# Patient Record
Sex: FEMALE | Race: WHITE | NOT HISPANIC OR LATINO | URBAN - METROPOLITAN AREA
[De-identification: names, ages, dates, MRNs, and addresses within clinical notes are randomized per-mention and may not be internally consistent; named-entity substitution may affect disease eponyms.]

---

## 2017-10-11 VITALS
SYSTOLIC BLOOD PRESSURE: 124 MMHG | TEMPERATURE: 98 F | WEIGHT: 179.9 LBS | HEART RATE: 75 BPM | RESPIRATION RATE: 18 BRPM | OXYGEN SATURATION: 98 % | HEIGHT: 69 IN | DIASTOLIC BLOOD PRESSURE: 65 MMHG

## 2017-10-11 RX ORDER — EZETIMIBE AND SIMVASTATIN 10; 80 MG/1; MG/1
1 TABLET, FILM COATED ORAL
Qty: 0 | Refills: 0 | COMMUNITY

## 2017-10-11 NOTE — PATIENT PROFILE ADULT. - PSH
Gastritis    History of laparoscopic cholecystectomy    History of total hysterectomy    Lactose Intolerance    S/P ORIF (open reduction internal fixation) fracture  left ankle   2 years ago

## 2017-10-11 NOTE — PATIENT PROFILE ADULT. - PMH
Anxiety    Colonic stricture    Diverticulitis    GI bleed    H/O hypercholesterolemia    H/O uterine leiomyoma    IBS (Irritable Bowel Syndrome)    Lumbar Disc Disease Anxiety    Colonic stricture    Diverticulitis    Fibromyalgia    GI bleed    H/O hypercholesterolemia    H/O uterine leiomyoma    IBS (Irritable Bowel Syndrome)    Lumbar Disc Disease

## 2017-10-11 NOTE — PATIENT PROFILE ADULT. - FAMILY HISTORY
Mother  Still living? Yes, Estimated age: 71-80  Family history of stroke or transient ischemic attack in mother, Age at diagnosis: Age Unknown     Father  Still living? No  Family history of cancer in mother, Age at diagnosis: Age Unknown

## 2017-10-12 ENCOUNTER — INPATIENT (INPATIENT)
Facility: HOSPITAL | Age: 52
LOS: 5 days | Discharge: HOME | DRG: 330 | End: 2017-10-18
Attending: SURGERY | Admitting: SURGERY
Payer: COMMERCIAL

## 2017-10-12 DIAGNOSIS — Z90.710 ACQUIRED ABSENCE OF BOTH CERVIX AND UTERUS: Chronic | ICD-10-CM

## 2017-10-12 DIAGNOSIS — K56.699 OTHER INTESTINAL OBSTRUCTION UNSPECIFIED AS TO PARTIAL VERSUS COMPLETE OBSTRUCTION: ICD-10-CM

## 2017-10-12 LAB
ANION GAP SERPL CALC-SCNC: 15 MMOL/L — SIGNIFICANT CHANGE UP (ref 5–17)
BASE EXCESS BLDA CALC-SCNC: -1.1 MMOL/L — SIGNIFICANT CHANGE UP (ref -2–3)
BASE EXCESS BLDA CALC-SCNC: -1.9 MMOL/L — SIGNIFICANT CHANGE UP (ref -2–3)
BUN SERPL-MCNC: 13 MG/DL — SIGNIFICANT CHANGE UP (ref 7–23)
CA-I BLDA-SCNC: 1.12 MMOL/L — SIGNIFICANT CHANGE UP (ref 1.12–1.3)
CA-I BLDA-SCNC: 1.15 MMOL/L — SIGNIFICANT CHANGE UP (ref 1.12–1.3)
CALCIUM SERPL-MCNC: 9.1 MG/DL — SIGNIFICANT CHANGE UP (ref 8.4–10.5)
CHLORIDE SERPL-SCNC: 100 MMOL/L — SIGNIFICANT CHANGE UP (ref 96–108)
CO2 SERPL-SCNC: 23 MMOL/L — SIGNIFICANT CHANGE UP (ref 22–31)
COHGB MFR BLDA: 1.7 % — HIGH
COHGB MFR BLDA: 2.2 % — HIGH
CREAT SERPL-MCNC: 0.65 MG/DL — SIGNIFICANT CHANGE UP (ref 0.5–1.3)
GAS PNL BLDA: SIGNIFICANT CHANGE UP
GAS PNL BLDA: SIGNIFICANT CHANGE UP
GLUCOSE BLDC GLUCOMTR-MCNC: 178 MG/DL — HIGH (ref 70–99)
GLUCOSE BLDC GLUCOMTR-MCNC: 88 MG/DL — SIGNIFICANT CHANGE UP (ref 70–99)
GLUCOSE SERPL-MCNC: 149 MG/DL — HIGH (ref 70–99)
HCO3 BLDA-SCNC: 22 MMOL/L — SIGNIFICANT CHANGE UP (ref 21–28)
HCO3 BLDA-SCNC: 23 MMOL/L — SIGNIFICANT CHANGE UP (ref 21–28)
HCT VFR BLD CALC: 40 % — SIGNIFICANT CHANGE UP (ref 34.5–45)
HGB BLD-MCNC: 13.3 G/DL — SIGNIFICANT CHANGE UP (ref 11.5–15.5)
HGB BLDA-MCNC: 14.9 G/DL — SIGNIFICANT CHANGE UP (ref 11.5–15.5)
HGB BLDA-MCNC: 15.6 G/DL — HIGH (ref 11.5–15.5)
MCHC RBC-ENTMCNC: 30.2 PG — SIGNIFICANT CHANGE UP (ref 27–34)
MCHC RBC-ENTMCNC: 33.3 G/DL — SIGNIFICANT CHANGE UP (ref 32–36)
MCV RBC AUTO: 90.9 FL — SIGNIFICANT CHANGE UP (ref 80–100)
METHGB MFR BLDA: 0.3 % — SIGNIFICANT CHANGE UP
METHGB MFR BLDA: 0.4 % — SIGNIFICANT CHANGE UP
O2 CT VFR BLDA CALC: 21 ML/DL — SIGNIFICANT CHANGE UP (ref 15–23)
O2 CT VFR BLDA CALC: 22 ML/DL — SIGNIFICANT CHANGE UP (ref 15–23)
OXYHGB MFR BLDA: 97 % — SIGNIFICANT CHANGE UP (ref 94–100)
OXYHGB MFR BLDA: 98 % — SIGNIFICANT CHANGE UP (ref 94–100)
PCO2 BLDA: 32 MMHG — SIGNIFICANT CHANGE UP (ref 32–45)
PCO2 BLDA: 39 MMHG — SIGNIFICANT CHANGE UP (ref 32–45)
PH BLDA: 7.39 — SIGNIFICANT CHANGE UP (ref 7.35–7.45)
PH BLDA: 7.46 — HIGH (ref 7.35–7.45)
PLATELET # BLD AUTO: 287 K/UL — SIGNIFICANT CHANGE UP (ref 150–400)
PO2 BLDA: 257 MMHG — HIGH (ref 83–108)
PO2 BLDA: 287 MMHG — HIGH (ref 83–108)
POTASSIUM BLDA-SCNC: 3.6 MMOL/L — SIGNIFICANT CHANGE UP (ref 3.5–4.9)
POTASSIUM BLDA-SCNC: 3.9 MMOL/L — SIGNIFICANT CHANGE UP (ref 3.5–4.9)
POTASSIUM SERPL-MCNC: 4 MMOL/L — SIGNIFICANT CHANGE UP (ref 3.5–5.3)
POTASSIUM SERPL-SCNC: 4 MMOL/L — SIGNIFICANT CHANGE UP (ref 3.5–5.3)
RBC # BLD: 4.4 M/UL — SIGNIFICANT CHANGE UP (ref 3.8–5.2)
RBC # FLD: 13.9 % — SIGNIFICANT CHANGE UP (ref 10.3–16.9)
SAO2 % BLDA: 100 % — SIGNIFICANT CHANGE UP (ref 95–100)
SAO2 % BLDA: 100 % — SIGNIFICANT CHANGE UP (ref 95–100)
SODIUM BLDA-SCNC: 134 MMOL/L — LOW (ref 138–146)
SODIUM BLDA-SCNC: 135 MMOL/L — LOW (ref 138–146)
SODIUM SERPL-SCNC: 138 MMOL/L — SIGNIFICANT CHANGE UP (ref 135–145)
WBC # BLD: 13.9 K/UL — HIGH (ref 3.8–10.5)
WBC # FLD AUTO: 13.9 K/UL — HIGH (ref 3.8–10.5)

## 2017-10-12 RX ORDER — HYDROMORPHONE HYDROCHLORIDE 2 MG/ML
0.5 INJECTION INTRAMUSCULAR; INTRAVENOUS; SUBCUTANEOUS EVERY 4 HOURS
Qty: 0 | Refills: 0 | Status: DISCONTINUED | OUTPATIENT
Start: 2017-10-12 | End: 2017-10-14

## 2017-10-12 RX ORDER — HYDROMORPHONE HYDROCHLORIDE 2 MG/ML
0.5 INJECTION INTRAMUSCULAR; INTRAVENOUS; SUBCUTANEOUS ONCE
Qty: 0 | Refills: 0 | Status: DISCONTINUED | OUTPATIENT
Start: 2017-10-12 | End: 2017-10-12

## 2017-10-12 RX ORDER — DULOXETINE HYDROCHLORIDE 30 MG/1
0 CAPSULE, DELAYED RELEASE ORAL
Qty: 0 | Refills: 0 | COMMUNITY

## 2017-10-12 RX ORDER — SODIUM CHLORIDE 9 MG/ML
1000 INJECTION, SOLUTION INTRAVENOUS
Qty: 0 | Refills: 0 | Status: DISCONTINUED | OUTPATIENT
Start: 2017-10-12 | End: 2017-10-16

## 2017-10-12 RX ORDER — HYDROMORPHONE HYDROCHLORIDE 2 MG/ML
0.5 INJECTION INTRAMUSCULAR; INTRAVENOUS; SUBCUTANEOUS
Qty: 0 | Refills: 0 | Status: DISCONTINUED | OUTPATIENT
Start: 2017-10-12 | End: 2017-10-14

## 2017-10-12 RX ORDER — HYDROMORPHONE HYDROCHLORIDE 2 MG/ML
1 INJECTION INTRAMUSCULAR; INTRAVENOUS; SUBCUTANEOUS EVERY 4 HOURS
Qty: 0 | Refills: 0 | Status: DISCONTINUED | OUTPATIENT
Start: 2017-10-12 | End: 2017-10-14

## 2017-10-12 RX ORDER — HYDROMORPHONE HYDROCHLORIDE 2 MG/ML
1 INJECTION INTRAMUSCULAR; INTRAVENOUS; SUBCUTANEOUS ONCE
Qty: 0 | Refills: 0 | Status: DISCONTINUED | OUTPATIENT
Start: 2017-10-12 | End: 2017-10-12

## 2017-10-12 RX ORDER — HEPARIN SODIUM 5000 [USP'U]/ML
5000 INJECTION INTRAVENOUS; SUBCUTANEOUS EVERY 8 HOURS
Qty: 0 | Refills: 0 | Status: DISCONTINUED | OUTPATIENT
Start: 2017-10-13 | End: 2017-10-18

## 2017-10-12 RX ORDER — CYCLOBENZAPRINE HYDROCHLORIDE 10 MG/1
0 TABLET, FILM COATED ORAL
Qty: 0 | Refills: 0 | COMMUNITY

## 2017-10-12 RX ORDER — EZETIMIBE AND SIMVASTATIN 10; 80 MG/1; MG/1
1 TABLET, FILM COATED ORAL
Qty: 0 | Refills: 0 | COMMUNITY

## 2017-10-12 RX ORDER — ACETAMINOPHEN 500 MG
1000 TABLET ORAL ONCE
Qty: 0 | Refills: 0 | Status: COMPLETED | OUTPATIENT
Start: 2017-10-12 | End: 2017-10-13

## 2017-10-12 RX ORDER — ONDANSETRON 8 MG/1
4 TABLET, FILM COATED ORAL EVERY 6 HOURS
Qty: 0 | Refills: 0 | Status: DISCONTINUED | OUTPATIENT
Start: 2017-10-12 | End: 2017-10-18

## 2017-10-12 RX ADMIN — HYDROMORPHONE HYDROCHLORIDE 1 MILLIGRAM(S): 2 INJECTION INTRAMUSCULAR; INTRAVENOUS; SUBCUTANEOUS at 17:32

## 2017-10-12 RX ADMIN — HYDROMORPHONE HYDROCHLORIDE 1 MILLIGRAM(S): 2 INJECTION INTRAMUSCULAR; INTRAVENOUS; SUBCUTANEOUS at 18:35

## 2017-10-12 RX ADMIN — HYDROMORPHONE HYDROCHLORIDE 0.5 MILLIGRAM(S): 2 INJECTION INTRAMUSCULAR; INTRAVENOUS; SUBCUTANEOUS at 20:50

## 2017-10-12 RX ADMIN — HYDROMORPHONE HYDROCHLORIDE 1 MILLIGRAM(S): 2 INJECTION INTRAMUSCULAR; INTRAVENOUS; SUBCUTANEOUS at 23:26

## 2017-10-12 RX ADMIN — HYDROMORPHONE HYDROCHLORIDE 1 MILLIGRAM(S): 2 INJECTION INTRAMUSCULAR; INTRAVENOUS; SUBCUTANEOUS at 18:20

## 2017-10-12 RX ADMIN — HYDROMORPHONE HYDROCHLORIDE 1 MILLIGRAM(S): 2 INJECTION INTRAMUSCULAR; INTRAVENOUS; SUBCUTANEOUS at 23:00

## 2017-10-12 RX ADMIN — HYDROMORPHONE HYDROCHLORIDE 1 MILLIGRAM(S): 2 INJECTION INTRAMUSCULAR; INTRAVENOUS; SUBCUTANEOUS at 17:10

## 2017-10-12 RX ADMIN — HYDROMORPHONE HYDROCHLORIDE 0.5 MILLIGRAM(S): 2 INJECTION INTRAMUSCULAR; INTRAVENOUS; SUBCUTANEOUS at 23:26

## 2017-10-12 NOTE — PROGRESS NOTE ADULT - SUBJECTIVE AND OBJECTIVE BOX
Team 4 Surgery Post-Op Note, PCN:     Pre-Op Dx: Diverticular disease  Procedure: Sigmoidectomy, laparoscopic    Surgeon: Will     Subjective: Resting comfortably.     Vital Signs Last 24 Hrs  T(C): 36.7 (12 Oct 2017 17:00), Max: 37.5 (12 Oct 2017 15:28)  T(F): 98.1 (12 Oct 2017 17:00), Max: 99.5 (12 Oct 2017 15:28)  HR: 82 (12 Oct 2017 17:30) (82 - 94)  BP: 100/55 (12 Oct 2017 17:00) (100/55 - 111/52)  BP(mean): 69 (12 Oct 2017 17:00) (66 - 72)  RR: 18 (12 Oct 2017 17:30) (10 - 20)  SpO2: 97% (12 Oct 2017 17:30) (97% - 100%)    Physical Exam:  General: NAD, resting comfortably in bed  Pulmonary: Nonlabored breathing, no respiratory distress  Cardiovascular: NSR  Abdominal: soft, NT/ND, Incisions C/D/I.   : Sapp with Ureteral stents.   Extremities: WWP, normal strength  Neuro: A/O x 3, CNs II-XII grossly intact, no focal deficits, normal motor/sensation  Pulses: palpable distal pulses    CAPILLARY BLOOD GLUCOSE      POCT Blood Glucose.: 178 mg/dL (12 Oct 2017 12:58)  POCT Blood Glucose.: 88 mg/dL (12 Oct 2017 08:15)        ABO Interpretation: O (10-12 @ 08:40)

## 2017-10-12 NOTE — PROGRESS NOTE ADULT - ASSESSMENT
52y Female s/p Lap-sigmoidectomy     Pain/nausea control PRN  Home meds  Incentive spirometer/OOB/Ambulate  IVF, Diet: NPO   AM labs  Sapp w/ uretheral stents

## 2017-10-12 NOTE — H&P ADULT - PMH
Anxiety    Colonic stricture    Diverticulitis    Fibromyalgia    GI bleed    H/O hypercholesterolemia    H/O uterine leiomyoma    IBS (Irritable Bowel Syndrome)    Lumbar Disc Disease

## 2017-10-12 NOTE — H&P ADULT - HISTORY OF PRESENT ILLNESS
51YOF with diverticular disease and subsequent stricture presents for elective colon resection (sigmoidectomy, EEA). Of note, patient found to have suspicious hepatic mass, suggestive of hemangioma.

## 2017-10-12 NOTE — BRIEF OPERATIVE NOTE - PROCEDURE
<<-----Click on this checkbox to enter Procedure Sigmoidectomy, laparoscopic  10/12/2017    Active  [facility]

## 2017-10-12 NOTE — H&P ADULT - PROBLEM SELECTOR PLAN 1
- To OR for elective colon resection and primary anastomosis.   - Admit to surgery post-operatively.   - Pain control. Nausea control.   - IVF.   - Await bowel function.   - Sapp, ureteral stents.   - DVT prophylaxis.

## 2017-10-12 NOTE — H&P ADULT - ASSESSMENT
51YOF with diverticular disease and inflammatory stricture presents for elective sigmoid resection and EEA.

## 2017-10-13 LAB
ANION GAP SERPL CALC-SCNC: 14 MMOL/L — SIGNIFICANT CHANGE UP (ref 5–17)
BASOPHILS NFR BLD AUTO: 0.1 % — SIGNIFICANT CHANGE UP (ref 0–2)
BUN SERPL-MCNC: 12 MG/DL — SIGNIFICANT CHANGE UP (ref 7–23)
CALCIUM SERPL-MCNC: 9.2 MG/DL — SIGNIFICANT CHANGE UP (ref 8.4–10.5)
CHLORIDE SERPL-SCNC: 99 MMOL/L — SIGNIFICANT CHANGE UP (ref 96–108)
CO2 SERPL-SCNC: 23 MMOL/L — SIGNIFICANT CHANGE UP (ref 22–31)
CREAT SERPL-MCNC: 0.59 MG/DL — SIGNIFICANT CHANGE UP (ref 0.5–1.3)
GLUCOSE SERPL-MCNC: 131 MG/DL — HIGH (ref 70–99)
HCT VFR BLD CALC: 37.1 % — SIGNIFICANT CHANGE UP (ref 34.5–45)
HGB BLD-MCNC: 12.4 G/DL — SIGNIFICANT CHANGE UP (ref 11.5–15.5)
LYMPHOCYTES # BLD AUTO: 6.4 % — LOW (ref 13–44)
MCHC RBC-ENTMCNC: 30.5 PG — SIGNIFICANT CHANGE UP (ref 27–34)
MCHC RBC-ENTMCNC: 33.4 G/DL — SIGNIFICANT CHANGE UP (ref 32–36)
MCV RBC AUTO: 91.4 FL — SIGNIFICANT CHANGE UP (ref 80–100)
MONOCYTES NFR BLD AUTO: 7.2 % — SIGNIFICANT CHANGE UP (ref 2–14)
NEUTROPHILS NFR BLD AUTO: 86.3 % — HIGH (ref 43–77)
PLATELET # BLD AUTO: 264 K/UL — SIGNIFICANT CHANGE UP (ref 150–400)
POTASSIUM SERPL-MCNC: 4.2 MMOL/L — SIGNIFICANT CHANGE UP (ref 3.5–5.3)
POTASSIUM SERPL-SCNC: 4.2 MMOL/L — SIGNIFICANT CHANGE UP (ref 3.5–5.3)
RBC # BLD: 4.06 M/UL — SIGNIFICANT CHANGE UP (ref 3.8–5.2)
RBC # FLD: 13.7 % — SIGNIFICANT CHANGE UP (ref 10.3–16.9)
SODIUM SERPL-SCNC: 136 MMOL/L — SIGNIFICANT CHANGE UP (ref 135–145)
WBC # BLD: 14.1 K/UL — HIGH (ref 3.8–10.5)
WBC # FLD AUTO: 14.1 K/UL — HIGH (ref 3.8–10.5)

## 2017-10-13 RX ORDER — DIPHENHYDRAMINE HCL 50 MG
25 CAPSULE ORAL ONCE
Qty: 0 | Refills: 0 | Status: COMPLETED | OUTPATIENT
Start: 2017-10-13 | End: 2017-10-13

## 2017-10-13 RX ADMIN — HEPARIN SODIUM 5000 UNIT(S): 5000 INJECTION INTRAVENOUS; SUBCUTANEOUS at 06:37

## 2017-10-13 RX ADMIN — HYDROMORPHONE HYDROCHLORIDE 0.5 MILLIGRAM(S): 2 INJECTION INTRAMUSCULAR; INTRAVENOUS; SUBCUTANEOUS at 13:57

## 2017-10-13 RX ADMIN — HYDROMORPHONE HYDROCHLORIDE 1 MILLIGRAM(S): 2 INJECTION INTRAMUSCULAR; INTRAVENOUS; SUBCUTANEOUS at 16:30

## 2017-10-13 RX ADMIN — Medication 1000 MILLIGRAM(S): at 00:21

## 2017-10-13 RX ADMIN — HYDROMORPHONE HYDROCHLORIDE 0.5 MILLIGRAM(S): 2 INJECTION INTRAMUSCULAR; INTRAVENOUS; SUBCUTANEOUS at 04:35

## 2017-10-13 RX ADMIN — HYDROMORPHONE HYDROCHLORIDE 1 MILLIGRAM(S): 2 INJECTION INTRAMUSCULAR; INTRAVENOUS; SUBCUTANEOUS at 16:18

## 2017-10-13 RX ADMIN — HEPARIN SODIUM 5000 UNIT(S): 5000 INJECTION INTRAVENOUS; SUBCUTANEOUS at 21:02

## 2017-10-13 RX ADMIN — Medication 25 MILLIGRAM(S): at 23:47

## 2017-10-13 RX ADMIN — HEPARIN SODIUM 5000 UNIT(S): 5000 INJECTION INTRAVENOUS; SUBCUTANEOUS at 13:58

## 2017-10-13 RX ADMIN — Medication 400 MILLIGRAM(S): at 00:04

## 2017-10-13 RX ADMIN — HYDROMORPHONE HYDROCHLORIDE 0.5 MILLIGRAM(S): 2 INJECTION INTRAMUSCULAR; INTRAVENOUS; SUBCUTANEOUS at 09:02

## 2017-10-13 RX ADMIN — HYDROMORPHONE HYDROCHLORIDE 1 MILLIGRAM(S): 2 INJECTION INTRAMUSCULAR; INTRAVENOUS; SUBCUTANEOUS at 12:05

## 2017-10-13 RX ADMIN — SODIUM CHLORIDE 100 MILLILITER(S): 9 INJECTION, SOLUTION INTRAVENOUS at 18:01

## 2017-10-13 RX ADMIN — HYDROMORPHONE HYDROCHLORIDE 0.5 MILLIGRAM(S): 2 INJECTION INTRAMUSCULAR; INTRAVENOUS; SUBCUTANEOUS at 14:22

## 2017-10-13 RX ADMIN — HYDROMORPHONE HYDROCHLORIDE 0.5 MILLIGRAM(S): 2 INJECTION INTRAMUSCULAR; INTRAVENOUS; SUBCUTANEOUS at 09:27

## 2017-10-13 RX ADMIN — HYDROMORPHONE HYDROCHLORIDE 1 MILLIGRAM(S): 2 INJECTION INTRAMUSCULAR; INTRAVENOUS; SUBCUTANEOUS at 21:05

## 2017-10-13 RX ADMIN — Medication 25 MILLIGRAM(S): at 12:30

## 2017-10-13 RX ADMIN — HYDROMORPHONE HYDROCHLORIDE 1 MILLIGRAM(S): 2 INJECTION INTRAMUSCULAR; INTRAVENOUS; SUBCUTANEOUS at 20:55

## 2017-10-13 RX ADMIN — HYDROMORPHONE HYDROCHLORIDE 1 MILLIGRAM(S): 2 INJECTION INTRAMUSCULAR; INTRAVENOUS; SUBCUTANEOUS at 11:46

## 2017-10-13 NOTE — PROGRESS NOTE ADULT - ASSESSMENT
51YOF with diverticular disease and subsequent stricture s/p elective colon resection (sigmoidectomy, EEA).    - Admitted to surgery post-operatively.   - Pain under control. Nausea under control.   - IVF- LR   - Awaiting bowel function  - Sapp, ureteral stents in place  - DVT prophylaxis- Hep SQ

## 2017-10-13 NOTE — PROGRESS NOTE ADULT - SUBJECTIVE AND OBJECTIVE BOX
ON: PREET  10/12: Lap sigmoidectomy, end-anastomosis. POC WNL.         51YOF with diverticular disease and subsequent stricture s/p elective colon resection (sigmoidectomy, EEA).  - Admit to surgery post-operatively.   - Pain control. Nausea control.   - IVF.   - Await bowel function.   - Sapp, ureteral stents.   - DVT prophylaxis. STATUS POST:   Lap sigmoidectomy, end-anastomosis    POST OPERATIVE DAY #: 1    Vital Signs Last 24 Hrs  T(C): 37 (13 Oct 2017 07:30), Max: 37.5 (12 Oct 2017 15:28)  T(F): 98.6 (13 Oct 2017 07:30), Max: 99.5 (12 Oct 2017 15:28)  HR: 73 (13 Oct 2017 07:10) (70 - 94)  BP: 109/55 (13 Oct 2017 07:10) (93/53 - 119/71)  BP(mean): 72 (12 Oct 2017 22:00) (64 - 88)  RR: 16 (13 Oct 2017 07:10) (10 - 20)  SpO2: 100% (13 Oct 2017 07:10) (92% - 100%)      SUBJECTIVE: Pt seen and examined. Reports pain is under control and she is resting comfortably following the procedure yesterday. She has not had flatus or BM yet.                Pain Control Adequate: [x ] YES [ ] NO  SOB: [ ]YES [x ] NO  Chest Discomfort: [ ] YES [ x] NO    Nausea: [ ] YES [x ] NO           Vomiting: [ ] YES [x ] NO  Flatus: [ ] YES [x ] NO             Bowel Movement: [ ] YES [x ] NO     Void: [x ]YES [ ]No    Sapp: yes      General Appearance: Appears well, NAD  Neck: Supple  Chest: Equal expansion bilaterally, equal breath sounds  CV: Pulse regular presently  Abdomen: Soft, nontense, appropriate incisional tenderness, dressings clean and dry and intact  Extremities: Grossly symmetric, SCD's in place     I&O's Summary    12 Oct 2017 07:01  -  13 Oct 2017 07:00  --------------------------------------------------------  IN: 4200 mL / OUT: 2255 mL / NET: 1945 mL    13 Oct 2017 07:01  -  13 Oct 2017 07:40  --------------------------------------------------------  IN: 100 mL / OUT: 80 mL / NET: 20 mL      I&O's Detail    12 Oct 2017 07:01  -  13 Oct 2017 07:00  --------------------------------------------------------  IN:    lactated ringers.: 4200 mL  Total IN: 4200 mL    OUT:    Estimated Blood Loss: 100 mL    Indwelling Catheter - Urethral: 1670 mL    Ureteral Catheter: 485 mL  Total OUT: 2255 mL    Total NET: 1945 mL      13 Oct 2017 07:01  -  13 Oct 2017 07:40  --------------------------------------------------------  IN:    lactated ringers.: 100 mL  Total IN: 100 mL    OUT:    Indwelling Catheter - Urethral: 60 mL    Ureteral Catheter: 20 mL  Total OUT: 80 mL    Total NET: 20 mL          MEDICATIONS  (STANDING):  diphenhydrAMINE   Injectable 25 milliGRAM(s) IV Push once  heparin  Injectable 5000 Unit(s) SubCutaneous every 8 hours  lactated ringers. 1000 milliLiter(s) (100 mL/Hr) IV Continuous <Continuous>    MEDICATIONS  (PRN):  HYDROmorphone  Injectable 1 milliGRAM(s) IV Push every 4 hours PRN Severe Pain  HYDROmorphone  Injectable 0.5 milliGRAM(s) IV Push every 2 hours PRN breakthough pain  HYDROmorphone  Injectable 0.5 milliGRAM(s) IV Push every 4 hours PRN Moderate Pain (4 - 6)  ondansetron Injectable 4 milliGRAM(s) IV Push every 6 hours PRN Nausea      LABS:                        12.4   14.1  )-----------( 264      ( 13 Oct 2017 03:55 )             37.1     10-13    136  |  99  |  12  ----------------------------<  131<H>  4.2   |  23  |  0.59    Ca    9.2      13 Oct 2017 03:55            RADIOLOGY & ADDITIONAL STUDIES:

## 2017-10-14 LAB
ANION GAP SERPL CALC-SCNC: 13 MMOL/L — SIGNIFICANT CHANGE UP (ref 5–17)
BUN SERPL-MCNC: 13 MG/DL — SIGNIFICANT CHANGE UP (ref 7–23)
CALCIUM SERPL-MCNC: 9.1 MG/DL — SIGNIFICANT CHANGE UP (ref 8.4–10.5)
CHLORIDE SERPL-SCNC: 98 MMOL/L — SIGNIFICANT CHANGE UP (ref 96–108)
CO2 SERPL-SCNC: 27 MMOL/L — SIGNIFICANT CHANGE UP (ref 22–31)
CREAT SERPL-MCNC: 0.61 MG/DL — SIGNIFICANT CHANGE UP (ref 0.5–1.3)
GLUCOSE SERPL-MCNC: 66 MG/DL — LOW (ref 70–99)
HCT VFR BLD CALC: 36.2 % — SIGNIFICANT CHANGE UP (ref 34.5–45)
HGB BLD-MCNC: 11.9 G/DL — SIGNIFICANT CHANGE UP (ref 11.5–15.5)
MAGNESIUM SERPL-MCNC: 2.2 MG/DL — SIGNIFICANT CHANGE UP (ref 1.6–2.6)
MCHC RBC-ENTMCNC: 30.9 PG — SIGNIFICANT CHANGE UP (ref 27–34)
MCHC RBC-ENTMCNC: 32.9 G/DL — SIGNIFICANT CHANGE UP (ref 32–36)
MCV RBC AUTO: 94 FL — SIGNIFICANT CHANGE UP (ref 80–100)
PHOSPHATE SERPL-MCNC: 2.5 MG/DL — SIGNIFICANT CHANGE UP (ref 2.5–4.5)
PLATELET # BLD AUTO: 226 K/UL — SIGNIFICANT CHANGE UP (ref 150–400)
POTASSIUM SERPL-MCNC: 3.3 MMOL/L — LOW (ref 3.5–5.3)
POTASSIUM SERPL-SCNC: 3.3 MMOL/L — LOW (ref 3.5–5.3)
RBC # BLD: 3.85 M/UL — SIGNIFICANT CHANGE UP (ref 3.8–5.2)
RBC # FLD: 13.8 % — SIGNIFICANT CHANGE UP (ref 10.3–16.9)
SODIUM SERPL-SCNC: 138 MMOL/L — SIGNIFICANT CHANGE UP (ref 135–145)
WBC # BLD: 9.8 K/UL — SIGNIFICANT CHANGE UP (ref 3.8–10.5)
WBC # FLD AUTO: 9.8 K/UL — SIGNIFICANT CHANGE UP (ref 3.8–10.5)

## 2017-10-14 RX ORDER — DIAZEPAM 5 MG
1 TABLET ORAL AT BEDTIME
Qty: 0 | Refills: 0 | Status: DISCONTINUED | OUTPATIENT
Start: 2017-10-14 | End: 2017-10-18

## 2017-10-14 RX ORDER — OXYCODONE AND ACETAMINOPHEN 5; 325 MG/1; MG/1
1 TABLET ORAL EVERY 4 HOURS
Qty: 0 | Refills: 0 | Status: DISCONTINUED | OUTPATIENT
Start: 2017-10-14 | End: 2017-10-18

## 2017-10-14 RX ORDER — OXYCODONE AND ACETAMINOPHEN 5; 325 MG/1; MG/1
2 TABLET ORAL EVERY 4 HOURS
Qty: 0 | Refills: 0 | Status: DISCONTINUED | OUTPATIENT
Start: 2017-10-14 | End: 2017-10-18

## 2017-10-14 RX ORDER — DULOXETINE HYDROCHLORIDE 30 MG/1
30 CAPSULE, DELAYED RELEASE ORAL DAILY
Qty: 0 | Refills: 0 | Status: DISCONTINUED | OUTPATIENT
Start: 2017-10-14 | End: 2017-10-18

## 2017-10-14 RX ORDER — CYCLOBENZAPRINE HYDROCHLORIDE 10 MG/1
10 TABLET, FILM COATED ORAL AT BEDTIME
Qty: 0 | Refills: 0 | Status: DISCONTINUED | OUTPATIENT
Start: 2017-10-14 | End: 2017-10-18

## 2017-10-14 RX ORDER — MAGNESIUM SULFATE 500 MG/ML
2 VIAL (ML) INJECTION ONCE
Qty: 0 | Refills: 0 | Status: COMPLETED | OUTPATIENT
Start: 2017-10-14 | End: 2017-10-14

## 2017-10-14 RX ORDER — POTASSIUM CHLORIDE 20 MEQ
10 PACKET (EA) ORAL
Qty: 0 | Refills: 0 | Status: COMPLETED | OUTPATIENT
Start: 2017-10-14 | End: 2017-10-14

## 2017-10-14 RX ORDER — POTASSIUM CHLORIDE 20 MEQ
20 PACKET (EA) ORAL
Qty: 0 | Refills: 0 | Status: COMPLETED | OUTPATIENT
Start: 2017-10-14 | End: 2017-10-14

## 2017-10-14 RX ADMIN — SODIUM CHLORIDE 100 MILLILITER(S): 9 INJECTION, SOLUTION INTRAVENOUS at 13:44

## 2017-10-14 RX ADMIN — HYDROMORPHONE HYDROCHLORIDE 1 MILLIGRAM(S): 2 INJECTION INTRAMUSCULAR; INTRAVENOUS; SUBCUTANEOUS at 10:31

## 2017-10-14 RX ADMIN — Medication 20 MILLIEQUIVALENT(S): at 23:07

## 2017-10-14 RX ADMIN — OXYCODONE AND ACETAMINOPHEN 2 TABLET(S): 5; 325 TABLET ORAL at 16:47

## 2017-10-14 RX ADMIN — DULOXETINE HYDROCHLORIDE 30 MILLIGRAM(S): 30 CAPSULE, DELAYED RELEASE ORAL at 11:43

## 2017-10-14 RX ADMIN — HEPARIN SODIUM 5000 UNIT(S): 5000 INJECTION INTRAVENOUS; SUBCUTANEOUS at 06:22

## 2017-10-14 RX ADMIN — HEPARIN SODIUM 5000 UNIT(S): 5000 INJECTION INTRAVENOUS; SUBCUTANEOUS at 22:00

## 2017-10-14 RX ADMIN — Medication 100 MILLIEQUIVALENT(S): at 13:44

## 2017-10-14 RX ADMIN — CYCLOBENZAPRINE HYDROCHLORIDE 10 MILLIGRAM(S): 10 TABLET, FILM COATED ORAL at 23:09

## 2017-10-14 RX ADMIN — HYDROMORPHONE HYDROCHLORIDE 0.5 MILLIGRAM(S): 2 INJECTION INTRAMUSCULAR; INTRAVENOUS; SUBCUTANEOUS at 02:55

## 2017-10-14 RX ADMIN — HYDROMORPHONE HYDROCHLORIDE 1 MILLIGRAM(S): 2 INJECTION INTRAMUSCULAR; INTRAVENOUS; SUBCUTANEOUS at 15:26

## 2017-10-14 RX ADMIN — HYDROMORPHONE HYDROCHLORIDE 1 MILLIGRAM(S): 2 INJECTION INTRAMUSCULAR; INTRAVENOUS; SUBCUTANEOUS at 00:46

## 2017-10-14 RX ADMIN — HYDROMORPHONE HYDROCHLORIDE 1 MILLIGRAM(S): 2 INJECTION INTRAMUSCULAR; INTRAVENOUS; SUBCUTANEOUS at 14:39

## 2017-10-14 RX ADMIN — Medication 50 GRAM(S): at 10:31

## 2017-10-14 RX ADMIN — OXYCODONE AND ACETAMINOPHEN 2 TABLET(S): 5; 325 TABLET ORAL at 17:30

## 2017-10-14 RX ADMIN — HYDROMORPHONE HYDROCHLORIDE 1 MILLIGRAM(S): 2 INJECTION INTRAMUSCULAR; INTRAVENOUS; SUBCUTANEOUS at 06:35

## 2017-10-14 RX ADMIN — Medication 100 MILLIEQUIVALENT(S): at 11:46

## 2017-10-14 RX ADMIN — OXYCODONE AND ACETAMINOPHEN 2 TABLET(S): 5; 325 TABLET ORAL at 21:47

## 2017-10-14 RX ADMIN — Medication 100 MILLIEQUIVALENT(S): at 16:29

## 2017-10-14 RX ADMIN — Medication 1 MILLIGRAM(S): at 21:09

## 2017-10-14 RX ADMIN — HYDROMORPHONE HYDROCHLORIDE 1 MILLIGRAM(S): 2 INJECTION INTRAMUSCULAR; INTRAVENOUS; SUBCUTANEOUS at 01:00

## 2017-10-14 RX ADMIN — HYDROMORPHONE HYDROCHLORIDE 1 MILLIGRAM(S): 2 INJECTION INTRAMUSCULAR; INTRAVENOUS; SUBCUTANEOUS at 11:10

## 2017-10-14 RX ADMIN — HEPARIN SODIUM 5000 UNIT(S): 5000 INJECTION INTRAVENOUS; SUBCUTANEOUS at 13:44

## 2017-10-14 RX ADMIN — OXYCODONE AND ACETAMINOPHEN 2 TABLET(S): 5; 325 TABLET ORAL at 20:47

## 2017-10-14 RX ADMIN — HYDROMORPHONE HYDROCHLORIDE 0.5 MILLIGRAM(S): 2 INJECTION INTRAMUSCULAR; INTRAVENOUS; SUBCUTANEOUS at 03:05

## 2017-10-14 RX ADMIN — HYDROMORPHONE HYDROCHLORIDE 1 MILLIGRAM(S): 2 INJECTION INTRAMUSCULAR; INTRAVENOUS; SUBCUTANEOUS at 06:22

## 2017-10-14 RX ADMIN — Medication 20 MILLIEQUIVALENT(S): at 21:10

## 2017-10-14 NOTE — PROGRESS NOTE ADULT - ASSESSMENT
51YOF with diverticular disease and subsequent stricture s/p elective colon resection (sigmoidectomy, EEA).     Neuro: percocet PRN, ativan at bedtime, flexiril prn, cymbalta , zofran prn   CV: Normotensive goals  Pulm: RA  GI/FEN: CLD  : Voids  ID: none  Endo: None  Heme: None  PPx: SCDs, OOB/A, IS

## 2017-10-14 NOTE — PROGRESS NOTE ADULT - SUBJECTIVE AND OBJECTIVE BOX
ON: PREET, no flatus, no BM        51YOF with diverticular disease and subsequent stricture s/p elective colon resection (sigmoidectomy, EEA).     - Pain control. Nausea control.   - IVF.   - NPO sips and chips  - Await bowel function.   - Sapp,   - DVT prophylaxis. ON: PREET, no flatus, no BM    SUBJECTIVE: Patient seen and examined bedside by surgery team. No ROBF. Restarted on home meds. Tolerating clears today. Sapp out and passed TOV with good UOP. heplocked.     heparin  Injectable 5000 Unit(s) SubCutaneous every 8 hours      Vital Signs Last 24 Hrs  T(C): 37.4 (14 Oct 2017 15:42), Max: 37.4 (14 Oct 2017 15:42)  T(F): 99.4 (14 Oct 2017 15:42), Max: 99.4 (14 Oct 2017 15:42)  HR: 83 (14 Oct 2017 15:42) (75 - 83)  BP: 108/66 (14 Oct 2017 15:42) (99/63 - 126/75)  BP(mean): --  RR: 18 (14 Oct 2017 15:42) (15 - 18)  SpO2: 92% (14 Oct 2017 15:42) (92% - 98%)  I&O's Detail    13 Oct 2017 07:01  -  14 Oct 2017 07:00  --------------------------------------------------------  IN:    lactated ringers.: 2000 mL  Total IN: 2000 mL    OUT:    Indwelling Catheter - Urethral: 2445 mL    Ureteral Catheter: 30 mL  Total OUT: 2475 mL    Total NET: -475 mL      14 Oct 2017 07:01  -  14 Oct 2017 19:54  --------------------------------------------------------  IN:    lactated ringers.: 1100 mL    Solution: 300 mL    Solution: 50 mL  Total IN: 1450 mL    OUT:    Indwelling Catheter - Urethral: 1425 mL    Voided: 1600 mL  Total OUT: 3025 mL    Total NET: -1575 mL          General: NAD, resting comfortably in bed  C/V: NSR  Pulm: Nonlabored breathing, no respiratory distress  Abd: soft, NT/ND. incisions c/d/i. no r/g   Extrem: WWP, no edema, SCDs in place      LABS:                        11.9   9.8   )-----------( 226      ( 14 Oct 2017 08:32 )             36.2     10-14    138  |  98  |  13  ----------------------------<  66<L>  3.3<L>   |  27  |  0.61    Ca    9.1      14 Oct 2017 08:32  Phos  2.5     10-14  Mg     2.2     10-14            RADIOLOGY & ADDITIONAL STUDIES:

## 2017-10-15 LAB
ANION GAP SERPL CALC-SCNC: 12 MMOL/L — SIGNIFICANT CHANGE UP (ref 5–17)
BUN SERPL-MCNC: 7 MG/DL — SIGNIFICANT CHANGE UP (ref 7–23)
CALCIUM SERPL-MCNC: 9.6 MG/DL — SIGNIFICANT CHANGE UP (ref 8.4–10.5)
CHLORIDE SERPL-SCNC: 98 MMOL/L — SIGNIFICANT CHANGE UP (ref 96–108)
CO2 SERPL-SCNC: 28 MMOL/L — SIGNIFICANT CHANGE UP (ref 22–31)
CREAT SERPL-MCNC: 0.61 MG/DL — SIGNIFICANT CHANGE UP (ref 0.5–1.3)
GLUCOSE SERPL-MCNC: 105 MG/DL — HIGH (ref 70–99)
MAGNESIUM SERPL-MCNC: 2.3 MG/DL — SIGNIFICANT CHANGE UP (ref 1.6–2.6)
PHOSPHATE SERPL-MCNC: 3.6 MG/DL — SIGNIFICANT CHANGE UP (ref 2.5–4.5)
POTASSIUM SERPL-MCNC: 4 MMOL/L — SIGNIFICANT CHANGE UP (ref 3.5–5.3)
POTASSIUM SERPL-SCNC: 4 MMOL/L — SIGNIFICANT CHANGE UP (ref 3.5–5.3)
SODIUM SERPL-SCNC: 138 MMOL/L — SIGNIFICANT CHANGE UP (ref 135–145)

## 2017-10-15 RX ADMIN — OXYCODONE AND ACETAMINOPHEN 2 TABLET(S): 5; 325 TABLET ORAL at 05:21

## 2017-10-15 RX ADMIN — OXYCODONE AND ACETAMINOPHEN 2 TABLET(S): 5; 325 TABLET ORAL at 12:25

## 2017-10-15 RX ADMIN — ONDANSETRON 4 MILLIGRAM(S): 8 TABLET, FILM COATED ORAL at 09:09

## 2017-10-15 RX ADMIN — OXYCODONE AND ACETAMINOPHEN 2 TABLET(S): 5; 325 TABLET ORAL at 22:42

## 2017-10-15 RX ADMIN — HEPARIN SODIUM 5000 UNIT(S): 5000 INJECTION INTRAVENOUS; SUBCUTANEOUS at 14:24

## 2017-10-15 RX ADMIN — OXYCODONE AND ACETAMINOPHEN 2 TABLET(S): 5; 325 TABLET ORAL at 21:42

## 2017-10-15 RX ADMIN — OXYCODONE AND ACETAMINOPHEN 2 TABLET(S): 5; 325 TABLET ORAL at 11:36

## 2017-10-15 RX ADMIN — DULOXETINE HYDROCHLORIDE 30 MILLIGRAM(S): 30 CAPSULE, DELAYED RELEASE ORAL at 11:33

## 2017-10-15 RX ADMIN — HEPARIN SODIUM 5000 UNIT(S): 5000 INJECTION INTRAVENOUS; SUBCUTANEOUS at 06:00

## 2017-10-15 RX ADMIN — SODIUM CHLORIDE 100 MILLILITER(S): 9 INJECTION, SOLUTION INTRAVENOUS at 17:05

## 2017-10-15 RX ADMIN — OXYCODONE AND ACETAMINOPHEN 2 TABLET(S): 5; 325 TABLET ORAL at 06:21

## 2017-10-15 RX ADMIN — OXYCODONE AND ACETAMINOPHEN 2 TABLET(S): 5; 325 TABLET ORAL at 17:05

## 2017-10-15 RX ADMIN — OXYCODONE AND ACETAMINOPHEN 2 TABLET(S): 5; 325 TABLET ORAL at 18:00

## 2017-10-15 RX ADMIN — HEPARIN SODIUM 5000 UNIT(S): 5000 INJECTION INTRAVENOUS; SUBCUTANEOUS at 22:00

## 2017-10-15 RX ADMIN — Medication 1 MILLIGRAM(S): at 21:48

## 2017-10-15 NOTE — PROVIDER CONTACT NOTE (OTHER) - RECOMMENDATIONS
JOSE Osorio made aware and stated it is probably due to distention that surrounding lap site areas are reddened.

## 2017-10-15 NOTE — PROGRESS NOTE ADULT - SUBJECTIVE AND OBJECTIVE BOX
SUBJECTIVE: Patient seen and examined bedside by surgery team. No ROBF. Restarted on home meds. Tolerating clears. restarted IF fluids          Vital Signs Last 24 Hrs  T(C): 36.7 (15 Oct 2017 09:07), Max: 37.4 (14 Oct 2017 15:42)  T(F): 98.1 (15 Oct 2017 09:07), Max: 99.4 (14 Oct 2017 15:42)  HR: 97 (15 Oct 2017 09:07) (77 - 97)  BP: 132/86 (15 Oct 2017 09:07) (108/66 - 132/86)  BP(mean): --  RR: 16 (15 Oct 2017 09:07) (16 - 18)  SpO2: 96% (15 Oct 2017 09:07) (92% - 96%)          General Appearance: Appears well, NAD  Neck: Supple  Chest: Equal expansion bilaterally, equal breath sounds  CV: Pulse regular presently  Abdomen: Soft, nontense, slightly distended, appropriate incisional tenderness, slight redness, dressings clean and dry and intact  Extremities: Iv infiltrated, Grossly symmetric, SCD's in place     I&O's Summary    14 Oct 2017 07:01  -  15 Oct 2017 07:00  --------------------------------------------------------  IN: 1450 mL / OUT: 4175 mL / NET: -2725 mL    15 Oct 2017 07:01  -  15 Oct 2017 12:51  --------------------------------------------------------  IN: 180 mL / OUT: 450 mL / NET: -270 mL      I&O's Detail    14 Oct 2017 07:01  -  15 Oct 2017 07:00  --------------------------------------------------------  IN:    lactated ringers.: 1100 mL    Solution: 300 mL    Solution: 50 mL  Total IN: 1450 mL    OUT:    Indwelling Catheter - Urethral: 1425 mL    Voided: 2750 mL  Total OUT: 4175 mL    Total NET: -2725 mL      15 Oct 2017 07:01  -  15 Oct 2017 12:51  --------------------------------------------------------  IN:    Oral Fluid: 180 mL  Total IN: 180 mL    OUT:    Voided: 450 mL  Total OUT: 450 mL    Total NET: -270 mL          MEDICATIONS  (STANDING):  diazepam    Tablet 1 milliGRAM(s) Oral at bedtime  DULoxetine 30 milliGRAM(s) Oral daily  heparin  Injectable 5000 Unit(s) SubCutaneous every 8 hours  lactated ringers. 1000 milliLiter(s) (100 mL/Hr) IV Continuous <Continuous>    MEDICATIONS  (PRN):  cyclobenzaprine 10 milliGRAM(s) Oral at bedtime PRN Muscle Spasm  ondansetron Injectable 4 milliGRAM(s) IV Push every 6 hours PRN Nausea  oxyCODONE    5 mG/acetaminophen 325 mG 1 Tablet(s) Oral every 4 hours PRN Moderate Pain (4 - 6)  oxyCODONE    5 mG/acetaminophen 325 mG 2 Tablet(s) Oral every 4 hours PRN Severe Pain (7 - 10)      LABS:                        11.9   9.8   )-----------( 226      ( 14 Oct 2017 08:32 )             36.2     10-15    138  |  98  |  7   ----------------------------<  105<H>  4.0   |  28  |  0.61    Ca    9.6      15 Oct 2017 06:31  Phos  3.6     10-15  Mg     2.3     10-15            RADIOLOGY & ADDITIONAL STUDIES: SUBJECTIVE: Patient seen and examined bedside by surgery team. +flatus. No BM. Restarted on home meds. Tolerating clears. Restarted IF fluids.          Vital Signs Last 24 Hrs  T(C): 36.7 (15 Oct 2017 09:07), Max: 37.4 (14 Oct 2017 15:42)  T(F): 98.1 (15 Oct 2017 09:07), Max: 99.4 (14 Oct 2017 15:42)  HR: 97 (15 Oct 2017 09:07) (77 - 97)  BP: 132/86 (15 Oct 2017 09:07) (108/66 - 132/86)  BP(mean): --  RR: 16 (15 Oct 2017 09:07) (16 - 18)  SpO2: 96% (15 Oct 2017 09:07) (92% - 96%)          General Appearance: Appears well, NAD  Neck: Supple  Chest: Equal expansion bilaterally, equal breath sounds  CV: Pulse regular presently  Abdomen: Soft, nontense, slightly distended, appropriate incisional tenderness, slight redness, dressings clean and dry and intact  Extremities: Iv infiltrated, Grossly symmetric, SCD's in place     I&O's Summary    14 Oct 2017 07:01  -  15 Oct 2017 07:00  --------------------------------------------------------  IN: 1450 mL / OUT: 4175 mL / NET: -2725 mL    15 Oct 2017 07:01  -  15 Oct 2017 12:51  --------------------------------------------------------  IN: 180 mL / OUT: 450 mL / NET: -270 mL      I&O's Detail    14 Oct 2017 07:01  -  15 Oct 2017 07:00  --------------------------------------------------------  IN:    lactated ringers.: 1100 mL    Solution: 300 mL    Solution: 50 mL  Total IN: 1450 mL    OUT:    Indwelling Catheter - Urethral: 1425 mL    Voided: 2750 mL  Total OUT: 4175 mL    Total NET: -2725 mL      15 Oct 2017 07:01  -  15 Oct 2017 12:51  --------------------------------------------------------  IN:    Oral Fluid: 180 mL  Total IN: 180 mL    OUT:    Voided: 450 mL  Total OUT: 450 mL    Total NET: -270 mL          MEDICATIONS  (STANDING):  diazepam    Tablet 1 milliGRAM(s) Oral at bedtime  DULoxetine 30 milliGRAM(s) Oral daily  heparin  Injectable 5000 Unit(s) SubCutaneous every 8 hours  lactated ringers. 1000 milliLiter(s) (100 mL/Hr) IV Continuous <Continuous>    MEDICATIONS  (PRN):  cyclobenzaprine 10 milliGRAM(s) Oral at bedtime PRN Muscle Spasm  ondansetron Injectable 4 milliGRAM(s) IV Push every 6 hours PRN Nausea  oxyCODONE    5 mG/acetaminophen 325 mG 1 Tablet(s) Oral every 4 hours PRN Moderate Pain (4 - 6)  oxyCODONE    5 mG/acetaminophen 325 mG 2 Tablet(s) Oral every 4 hours PRN Severe Pain (7 - 10)      LABS:                        11.9   9.8   )-----------( 226      ( 14 Oct 2017 08:32 )             36.2     10-15    138  |  98  |  7   ----------------------------<  105<H>  4.0   |  28  |  0.61    Ca    9.6      15 Oct 2017 06:31  Phos  3.6     10-15  Mg     2.3     10-15            RADIOLOGY & ADDITIONAL STUDIES:

## 2017-10-15 NOTE — PROVIDER CONTACT NOTE (OTHER) - ASSESSMENT
During morning assessments patient has blanchable erythema around all surgical incision sites. Patient denied pain during palpation of area. Abd is still distended and tender upon palpation.

## 2017-10-15 NOTE — PROGRESS NOTE ADULT - ASSESSMENT
51YOF with diverticular disease and subsequent stricture s/p elective colon resection (sigmoidectomy, EEA).       restarted IV fluids  Neuro: percocet PRN, ativan at bedtime, flexiril prn, cymbalta , zofran prn   CV: Normotensive goals  Pulm: RA  GI/FEN: CLD  : Voids  ID: none  Endo: None  Heme: None  PPx: SCDs, OOB/A, IS 51yF with diverticular disease and subsequent stricture s/p elective colon resection (sigmoidectomy, EEA).       restarted IV fluids  Neuro: percocet PRN, ativan at bedtime, flexiril prn, cymbalta , zofran prn   CV: Normotensive goals  Pulm: RA  GI/FEN: CLD  : Voids  ID: none  Endo: None  Heme: None  PPx: SCDs, OOB/A, IS

## 2017-10-15 NOTE — PROVIDER CONTACT NOTE (OTHER) - ACTION/TREATMENT ORDERED:
No intervention at this time. JOSE Sullivan aware. Will continue to monitor sx sites for increase in erythema

## 2017-10-16 LAB
ANION GAP SERPL CALC-SCNC: 11 MMOL/L — SIGNIFICANT CHANGE UP (ref 5–17)
BUN SERPL-MCNC: 7 MG/DL — SIGNIFICANT CHANGE UP (ref 7–23)
CALCIUM SERPL-MCNC: 9.7 MG/DL — SIGNIFICANT CHANGE UP (ref 8.4–10.5)
CHLORIDE SERPL-SCNC: 101 MMOL/L — SIGNIFICANT CHANGE UP (ref 96–108)
CO2 SERPL-SCNC: 29 MMOL/L — SIGNIFICANT CHANGE UP (ref 22–31)
CREAT SERPL-MCNC: 0.62 MG/DL — SIGNIFICANT CHANGE UP (ref 0.5–1.3)
GLUCOSE SERPL-MCNC: 109 MG/DL — HIGH (ref 70–99)
HCT VFR BLD CALC: 36.2 % — SIGNIFICANT CHANGE UP (ref 34.5–45)
HGB BLD-MCNC: 12.3 G/DL — SIGNIFICANT CHANGE UP (ref 11.5–15.5)
MAGNESIUM SERPL-MCNC: 2.2 MG/DL — SIGNIFICANT CHANGE UP (ref 1.6–2.6)
MCHC RBC-ENTMCNC: 31.2 PG — SIGNIFICANT CHANGE UP (ref 27–34)
MCHC RBC-ENTMCNC: 34 G/DL — SIGNIFICANT CHANGE UP (ref 32–36)
MCV RBC AUTO: 91.9 FL — SIGNIFICANT CHANGE UP (ref 80–100)
PHOSPHATE SERPL-MCNC: 4.3 MG/DL — SIGNIFICANT CHANGE UP (ref 2.5–4.5)
PLATELET # BLD AUTO: 258 K/UL — SIGNIFICANT CHANGE UP (ref 150–400)
POTASSIUM SERPL-MCNC: 4.4 MMOL/L — SIGNIFICANT CHANGE UP (ref 3.5–5.3)
POTASSIUM SERPL-SCNC: 4.4 MMOL/L — SIGNIFICANT CHANGE UP (ref 3.5–5.3)
RBC # BLD: 3.94 M/UL — SIGNIFICANT CHANGE UP (ref 3.8–5.2)
RBC # FLD: 13.6 % — SIGNIFICANT CHANGE UP (ref 10.3–16.9)
SODIUM SERPL-SCNC: 141 MMOL/L — SIGNIFICANT CHANGE UP (ref 135–145)
WBC # BLD: 6.5 K/UL — SIGNIFICANT CHANGE UP (ref 3.8–10.5)
WBC # FLD AUTO: 6.5 K/UL — SIGNIFICANT CHANGE UP (ref 3.8–10.5)

## 2017-10-16 RX ORDER — DIPHENHYDRAMINE HCL 50 MG
25 CAPSULE ORAL EVERY 6 HOURS
Qty: 0 | Refills: 0 | Status: DISCONTINUED | OUTPATIENT
Start: 2017-10-16 | End: 2017-10-18

## 2017-10-16 RX ADMIN — HEPARIN SODIUM 5000 UNIT(S): 5000 INJECTION INTRAVENOUS; SUBCUTANEOUS at 14:00

## 2017-10-16 RX ADMIN — HEPARIN SODIUM 5000 UNIT(S): 5000 INJECTION INTRAVENOUS; SUBCUTANEOUS at 22:38

## 2017-10-16 RX ADMIN — OXYCODONE AND ACETAMINOPHEN 2 TABLET(S): 5; 325 TABLET ORAL at 10:58

## 2017-10-16 RX ADMIN — OXYCODONE AND ACETAMINOPHEN 2 TABLET(S): 5; 325 TABLET ORAL at 06:02

## 2017-10-16 RX ADMIN — OXYCODONE AND ACETAMINOPHEN 2 TABLET(S): 5; 325 TABLET ORAL at 20:47

## 2017-10-16 RX ADMIN — OXYCODONE AND ACETAMINOPHEN 2 TABLET(S): 5; 325 TABLET ORAL at 07:02

## 2017-10-16 RX ADMIN — OXYCODONE AND ACETAMINOPHEN 2 TABLET(S): 5; 325 TABLET ORAL at 22:19

## 2017-10-16 RX ADMIN — Medication 25 MILLIGRAM(S): at 12:46

## 2017-10-16 RX ADMIN — OXYCODONE AND ACETAMINOPHEN 2 TABLET(S): 5; 325 TABLET ORAL at 11:30

## 2017-10-16 RX ADMIN — HEPARIN SODIUM 5000 UNIT(S): 5000 INJECTION INTRAVENOUS; SUBCUTANEOUS at 06:00

## 2017-10-16 RX ADMIN — Medication 1 MILLIGRAM(S): at 22:35

## 2017-10-16 RX ADMIN — ONDANSETRON 4 MILLIGRAM(S): 8 TABLET, FILM COATED ORAL at 22:37

## 2017-10-16 RX ADMIN — OXYCODONE AND ACETAMINOPHEN 2 TABLET(S): 5; 325 TABLET ORAL at 17:30

## 2017-10-16 RX ADMIN — OXYCODONE AND ACETAMINOPHEN 2 TABLET(S): 5; 325 TABLET ORAL at 16:44

## 2017-10-16 RX ADMIN — DULOXETINE HYDROCHLORIDE 30 MILLIGRAM(S): 30 CAPSULE, DELAYED RELEASE ORAL at 10:58

## 2017-10-16 NOTE — PROGRESS NOTE ADULT - ASSESSMENT
52F s/p laparoscopic sigmoidectomy with end to end anastamosis       Neuro: percocet PRN, ativan at bedtime, flexiril prn, cymbalta , zofran prn   CV: Normotensive goals, LR  Pulm: RA  GI/FEN: soft diet  : Voids  ID: none  Endo: None  Heme: None  PPx: SCDs, OOB/A, IS

## 2017-10-16 NOTE — PROGRESS NOTE ADULT - SUBJECTIVE AND OBJECTIVE BOX
ON: Not able to tolerate soft diet. Became softly distended and nauseous. -F -BM since early yesterday.   10/15: . +flatus. no BM. Restarted IVF. Advanced to soft diet        52F s/p laparoscopic sigmoidectomy with end to end anastamosis       Neuro: percocet PRN, ativan at bedtime, flexiril prn, cymbalta , zofran prn   CV: Normotensive goals, LR  Pulm: RA  GI/FEN: soft diet  : Voids  ID: none  Endo: None  Heme: None  PPx: SCDs, OOB/A, IS STATUS POST:  lap sigmoidectomy, End anastamosis    POST OPERATIVE DAY #: 4    Vital Signs Last 24 Hrs  T(C): 36.5 (16 Oct 2017 05:58), Max: 36.9 (15 Oct 2017 13:33)  T(F): 97.7 (16 Oct 2017 05:58), Max: 98.5 (15 Oct 2017 17:06)  HR: 73 (16 Oct 2017 05:58) (73 - 97)  BP: 115/79 (16 Oct 2017 05:58) (105/72 - 132/86)  BP(mean): --  RR: 16 (16 Oct 2017 05:58) (16 - 17)  SpO2: 94% (16 Oct 2017 05:58) (93% - 97%)      SUBJECTIVE: Pt seen and examined bedside. She was not able to tolerate the soft diet well overnight and felt nauseous. Not currently nauseous. +F last night, -BM.                 Pain Control Adequate: [x ] YES [ ] NO  SOB: [ ]YES [ x] NO  Chest Discomfort: [ ] YES [x ] NO    Nausea: [ ] YES [x ] NO           Vomiting: [ ] YES [ ] NO  Flatus: [x ] YES [ ] NO             Bowel Movement: [ ] YES [ x] NO     Void: [x ]YES [ ]No        General Appearance: Appears well, NAD  Neck: Supple  Chest: Equal expansion bilaterally, equal breath sounds  CV: Pulse regular presently  Abdomen: Soft, nontense, mildly distended, appropriate incisional tenderness, dressings clean and dry and intact  Extremities: Grossly symmetric, SCD's in place     I&O's Summary    15 Oct 2017 07:01  -  16 Oct 2017 07:00  --------------------------------------------------------  IN: 3380 mL / OUT: 3050 mL / NET: 330 mL    16 Oct 2017 07:01  -  16 Oct 2017 09:00  --------------------------------------------------------  IN: 100 mL / OUT: 800 mL / NET: -700 mL      I&O's Detail    15 Oct 2017 07:01  -  16 Oct 2017 07:00  --------------------------------------------------------  IN:    lactated ringers.: 2200 mL    Oral Fluid: 1180 mL  Total IN: 3380 mL    OUT:    Voided: 3050 mL  Total OUT: 3050 mL    Total NET: 330 mL      16 Oct 2017 07:01  -  16 Oct 2017 09:00  --------------------------------------------------------  IN:    lactated ringers.: 100 mL  Total IN: 100 mL    OUT:    Voided: 800 mL  Total OUT: 800 mL    Total NET: -700 mL          MEDICATIONS  (STANDING):  diazepam    Tablet 1 milliGRAM(s) Oral at bedtime  DULoxetine 30 milliGRAM(s) Oral daily  heparin  Injectable 5000 Unit(s) SubCutaneous every 8 hours    MEDICATIONS  (PRN):  cyclobenzaprine 10 milliGRAM(s) Oral at bedtime PRN Muscle Spasm  ondansetron Injectable 4 milliGRAM(s) IV Push every 6 hours PRN Nausea  oxyCODONE    5 mG/acetaminophen 325 mG 1 Tablet(s) Oral every 4 hours PRN Moderate Pain (4 - 6)  oxyCODONE    5 mG/acetaminophen 325 mG 2 Tablet(s) Oral every 4 hours PRN Severe Pain (7 - 10)      LABS:                        12.3   6.5   )-----------( 258      ( 16 Oct 2017 06:33 )             36.2     10-16    141  |  101  |  7   ----------------------------<  109<H>  4.4   |  29  |  0.62    Ca    9.7      16 Oct 2017 06:32  Phos  4.3     10-16  Mg     2.2     10-16            RADIOLOGY & ADDITIONAL STUDIES:

## 2017-10-17 RX ADMIN — Medication 1 MILLIGRAM(S): at 21:17

## 2017-10-17 RX ADMIN — CYCLOBENZAPRINE HYDROCHLORIDE 10 MILLIGRAM(S): 10 TABLET, FILM COATED ORAL at 21:17

## 2017-10-17 RX ADMIN — Medication 25 MILLIGRAM(S): at 23:08

## 2017-10-17 RX ADMIN — HEPARIN SODIUM 5000 UNIT(S): 5000 INJECTION INTRAVENOUS; SUBCUTANEOUS at 22:00

## 2017-10-17 RX ADMIN — HEPARIN SODIUM 5000 UNIT(S): 5000 INJECTION INTRAVENOUS; SUBCUTANEOUS at 14:12

## 2017-10-17 RX ADMIN — HEPARIN SODIUM 5000 UNIT(S): 5000 INJECTION INTRAVENOUS; SUBCUTANEOUS at 05:53

## 2017-10-17 RX ADMIN — DULOXETINE HYDROCHLORIDE 30 MILLIGRAM(S): 30 CAPSULE, DELAYED RELEASE ORAL at 11:42

## 2017-10-17 NOTE — DIETITIAN INITIAL EVALUATION ADULT. - OTHER INFO
51y/o F s/p lap sigmoidectomy end anastomosis. Pt seen resting in bed. She reports a persistent lack of appetite and is only consuming ~25% of meals per report. Pt without BM and feeling constipated; + flatus. PTA pt reports eating well and denies wt changes. She reports suffering from chronic C. Discussed nutrition with C and provided low fiber diet education for post-op. Pt refusing ONS at present and wants to try to eat better. Will follow.

## 2017-10-17 NOTE — PROGRESS NOTE ADULT - SUBJECTIVE AND OBJECTIVE BOX
ON: tolerating diet. continued flatus, no BM  10/16: Multiple episodes of flatus. No BM yet. Tolerating soft diet better, no nausea.         52F s/p laparoscopic sigmoidectomy with end to end anastamosis       Neuro: percocet PRN, ativan at bedtime, flexiril prn, cymbalta , zofran prn   CV: Normotensive goals, LR  Pulm: RA  GI/FEN: soft diet  : Voids  ID: none  Endo: None  Heme: None  PPx: SCDs, OOB/A, IS ON: tolerating diet. continued flatus, no BM  10/16: Multiple episodes of flatus. No BM yet. Tolerating soft diet better, no nausea.     STATUS POST:  lap sigmoidectomy, end anastamosis    POST OPERATIVE DAY #: 5    Vital Signs Last 24 Hrs  T(C): 36.8 (17 Oct 2017 06:02), Max: 37.2 (16 Oct 2017 18:19)  T(F): 98.2 (17 Oct 2017 06:02), Max: 98.9 (16 Oct 2017 18:19)  HR: 65 (17 Oct 2017 06:02) (65 - 83)  BP: 101/70 (17 Oct 2017 06:02) (101/70 - 113/74)  BP(mean): --  RR: 17 (17 Oct 2017 06:02) (17 - 18)  SpO2: 96% (17 Oct 2017 06:02) (94% - 97%)      SUBJECTIVE: Pt seen and examined bedside. She reports multiple episodes of flatus but not BM yet. She has been walking around and has been tolerating her diet well.        General Appearance: Appears well, NAD  Neck: Supple  Chest: Equal expansion bilaterally, equal breath sounds  CV: Pulse regular presently  Abdomen: Soft, nontense, nontender, slightly distended, appropriate incisional tenderness, dressings clean and dry and intact  Extremities: Grossly symmetric, SCD's in place     I&O's Summary    16 Oct 2017 07:01  -  17 Oct 2017 07:00  --------------------------------------------------------  IN: 660 mL / OUT: 2650 mL / NET: -1990 mL      I&O's Detail    16 Oct 2017 07:01  -  17 Oct 2017 07:00  --------------------------------------------------------  IN:    lactated ringers.: 100 mL    Oral Fluid: 560 mL  Total IN: 660 mL    OUT:    Voided: 2650 mL  Total OUT: 2650 mL    Total NET: -1990 mL          MEDICATIONS  (STANDING):  diazepam    Tablet 1 milliGRAM(s) Oral at bedtime  DULoxetine 30 milliGRAM(s) Oral daily  heparin  Injectable 5000 Unit(s) SubCutaneous every 8 hours    MEDICATIONS  (PRN):  cyclobenzaprine 10 milliGRAM(s) Oral at bedtime PRN Muscle Spasm  diphenhydrAMINE   Capsule 25 milliGRAM(s) Oral every 6 hours PRN Rash and/or Itching  ondansetron Injectable 4 milliGRAM(s) IV Push every 6 hours PRN Nausea  oxyCODONE    5 mG/acetaminophen 325 mG 1 Tablet(s) Oral every 4 hours PRN Moderate Pain (4 - 6)  oxyCODONE    5 mG/acetaminophen 325 mG 2 Tablet(s) Oral every 4 hours PRN Severe Pain (7 - 10)      LABS:                        12.3   6.5   )-----------( 258      ( 16 Oct 2017 06:33 )             36.2     10-16    141  |  101  |  7   ----------------------------<  109<H>  4.4   |  29  |  0.62    Ca    9.7      16 Oct 2017 06:32  Phos  4.3     10-16  Mg     2.2     10-16

## 2017-10-18 ENCOUNTER — TRANSCRIPTION ENCOUNTER (OUTPATIENT)
Age: 52
End: 2017-10-18

## 2017-10-18 VITALS
HEART RATE: 73 BPM | RESPIRATION RATE: 16 BRPM | DIASTOLIC BLOOD PRESSURE: 63 MMHG | TEMPERATURE: 98 F | SYSTOLIC BLOOD PRESSURE: 108 MMHG | OXYGEN SATURATION: 96 %

## 2017-10-18 PROCEDURE — 86900 BLOOD TYPING SEROLOGIC ABO: CPT

## 2017-10-18 PROCEDURE — 80048 BASIC METABOLIC PNL TOTAL CA: CPT

## 2017-10-18 PROCEDURE — 84132 ASSAY OF SERUM POTASSIUM: CPT

## 2017-10-18 PROCEDURE — 36415 COLL VENOUS BLD VENIPUNCTURE: CPT

## 2017-10-18 PROCEDURE — 85018 HEMOGLOBIN: CPT

## 2017-10-18 PROCEDURE — 88307 TISSUE EXAM BY PATHOLOGIST: CPT

## 2017-10-18 PROCEDURE — 85025 COMPLETE CBC W/AUTO DIFF WBC: CPT

## 2017-10-18 PROCEDURE — C1758: CPT

## 2017-10-18 PROCEDURE — 82330 ASSAY OF CALCIUM: CPT

## 2017-10-18 PROCEDURE — 84100 ASSAY OF PHOSPHORUS: CPT

## 2017-10-18 PROCEDURE — 88304 TISSUE EXAM BY PATHOLOGIST: CPT

## 2017-10-18 PROCEDURE — 85027 COMPLETE CBC AUTOMATED: CPT

## 2017-10-18 PROCEDURE — 86901 BLOOD TYPING SEROLOGIC RH(D): CPT

## 2017-10-18 PROCEDURE — 86850 RBC ANTIBODY SCREEN: CPT

## 2017-10-18 PROCEDURE — 84295 ASSAY OF SERUM SODIUM: CPT

## 2017-10-18 PROCEDURE — 83735 ASSAY OF MAGNESIUM: CPT

## 2017-10-18 PROCEDURE — C1769: CPT

## 2017-10-18 PROCEDURE — 82962 GLUCOSE BLOOD TEST: CPT

## 2017-10-18 RX ORDER — ACETAMINOPHEN 500 MG
650 TABLET ORAL EVERY 6 HOURS
Qty: 0 | Refills: 0 | Status: DISCONTINUED | OUTPATIENT
Start: 2017-10-18 | End: 2017-10-18

## 2017-10-18 RX ORDER — IBUPROFEN 200 MG
400 TABLET ORAL EVERY 6 HOURS
Qty: 0 | Refills: 0 | Status: DISCONTINUED | OUTPATIENT
Start: 2017-10-18 | End: 2017-10-18

## 2017-10-18 RX ADMIN — HEPARIN SODIUM 5000 UNIT(S): 5000 INJECTION INTRAVENOUS; SUBCUTANEOUS at 06:29

## 2017-10-18 RX ADMIN — DULOXETINE HYDROCHLORIDE 30 MILLIGRAM(S): 30 CAPSULE, DELAYED RELEASE ORAL at 12:54

## 2017-10-18 NOTE — DISCHARGE NOTE ADULT - PATIENT PORTAL LINK FT
“You can access the [employer] Patient Portal, offered by [facility], by registering with the following website: [email or web address]”

## 2017-10-18 NOTE — PROGRESS NOTE ADULT - SUBJECTIVE AND OBJECTIVE BOX
ON: Continued flatus, no BM. ABD softly distended. OOB  10/17: No BM, +Flatus. OOB.           52F s/p laparoscopic sigmoidectomy with end to end anastamosis       Neuro: percocet PRN, ativan at bedtime, flexiril prn, cymbalta , zofran prn   CV: Normotensive goals, LR  Pulm: RA  GI/FEN: soft diet  : Voids  ID: none  Endo: None  Heme: None  PPx: SCDs, OOB/A, IS ON: Continued flatus, no BM. ABD softly distended. OOB  10/17: No BM, +Flatus. OOB.     STATUS POST:  10/12/17 sigmoidectomy with end to end anastamosis POD#6     SUBJECTIVE:  Patient seen and examined at bedside with surgery chief resident on AM rounds. Denies flatus since yesterday, no BM    MEDICATIONS  (STANDING):  diazepam    Tablet 1 milliGRAM(s) Oral at bedtime  DULoxetine 30 milliGRAM(s) Oral daily  heparin  Injectable 5000 Unit(s) SubCutaneous every 8 hours    MEDICATIONS  (PRN):  cyclobenzaprine 10 milliGRAM(s) Oral at bedtime PRN Muscle Spasm  diphenhydrAMINE   Capsule 25 milliGRAM(s) Oral every 6 hours PRN Rash and/or Itching  ondansetron Injectable 4 milliGRAM(s) IV Push every 6 hours PRN Nausea  oxyCODONE    5 mG/acetaminophen 325 mG 1 Tablet(s) Oral every 4 hours PRN Moderate Pain (4 - 6)  oxyCODONE    5 mG/acetaminophen 325 mG 2 Tablet(s) Oral every 4 hours PRN Severe Pain (7 - 10)      Vital Signs Last 24 Hrs  T(C): 36.1 (18 Oct 2017 06:07), Max: 37.5 (17 Oct 2017 14:23)  T(F): 97 (18 Oct 2017 06:07), Max: 99.5 (17 Oct 2017 14:23)  HR: 67 (18 Oct 2017 06:07) (63 - 89)  BP: 110/75 (18 Oct 2017 06:07) (94/62 - 113/80)  BP(mean): --  RR: 17 (18 Oct 2017 06:07) (16 - 17)  SpO2: 96% (18 Oct 2017 06:07) (96% - 100%)    PHYSICAL EXAM:      Constitutional: A&Ox3    Respiratory: non labored breathing, no respiratory distress    Gastrointestinal: soft, mildly distended, appropriately TTP                 Incision: c/d/i     Extremities: (-) edema      I&O's Detail    17 Oct 2017 07:01  -  18 Oct 2017 07:00  --------------------------------------------------------  IN:    Oral Fluid: 1220 mL  Total IN: 1220 mL    OUT:    Voided: 1100 mL  Total OUT: 1100 mL    Total NET: 120 mL ON: Continued flatus, no BM. ABD softly distended. OOB  10/17: No BM, +Flatus. OOB.     STATUS POST:  10/12/17 sigmoidectomy with end to end anastamosis POD#6     SUBJECTIVE:  Patient seen and examined at bedside with surgery chief resident on AM rounds. Denies flatus since yesterday, no BM. OOBA without issues, tolerating diet.    MEDICATIONS  (STANDING):  diazepam    Tablet 1 milliGRAM(s) Oral at bedtime  DULoxetine 30 milliGRAM(s) Oral daily  heparin  Injectable 5000 Unit(s) SubCutaneous every 8 hours    MEDICATIONS  (PRN):  cyclobenzaprine 10 milliGRAM(s) Oral at bedtime PRN Muscle Spasm  diphenhydrAMINE   Capsule 25 milliGRAM(s) Oral every 6 hours PRN Rash and/or Itching  ondansetron Injectable 4 milliGRAM(s) IV Push every 6 hours PRN Nausea  oxyCODONE    5 mG/acetaminophen 325 mG 1 Tablet(s) Oral every 4 hours PRN Moderate Pain (4 - 6)  oxyCODONE    5 mG/acetaminophen 325 mG 2 Tablet(s) Oral every 4 hours PRN Severe Pain (7 - 10)      Vital Signs Last 24 Hrs  T(C): 36.1 (18 Oct 2017 06:07), Max: 37.5 (17 Oct 2017 14:23)  T(F): 97 (18 Oct 2017 06:07), Max: 99.5 (17 Oct 2017 14:23)  HR: 67 (18 Oct 2017 06:07) (63 - 89)  BP: 110/75 (18 Oct 2017 06:07) (94/62 - 113/80)  BP(mean): --  RR: 17 (18 Oct 2017 06:07) (16 - 17)  SpO2: 96% (18 Oct 2017 06:07) (96% - 100%)    PHYSICAL EXAM:      Constitutional: A&Ox3    Respiratory: non labored breathing, no respiratory distress    Gastrointestinal: soft, mildly distended, appropriately TTP                 Incision: c/d/i     Extremities: (-) edema      I&O's Detail    17 Oct 2017 07:01  -  18 Oct 2017 07:00  --------------------------------------------------------  IN:    Oral Fluid: 1220 mL  Total IN: 1220 mL    OUT:    Voided: 1100 mL  Total OUT: 1100 mL    Total NET: 120 mL

## 2017-10-18 NOTE — DISCHARGE NOTE ADULT - HOSPITAL COURSE
Ms. Lee, is a 51YOF with diverticular disease and subsequent stricture presents for elective colon resection (sigmoidectomy, End-to-End Anastomosis). Patient's post-operative course was uncomplicated. Diet was advanced as tolerated and pain was well controlled on medication. Patient was monitored for return of bowel function, continued to have a soft abdomen throughout her stay. On day of discharge, pt deemed stable and ready to return home with plan to follow up as an outpatient. Ms. Lee, is a 51YOF with diverticular disease and subsequent stricture presents for elective colon resection (sigmoidectomy, End-to-End Anastomosis). Patient's post-operative course was uncomplicated. Diet was advanced as tolerated and pain was well controlled on medication. Patient was monitored for return of bowel function, continued to have a soft abdomen throughout her stay. On day of discharge, pt had a large bowel movement, and was deemed stable and ready to return home with plan to follow up as an outpatient.

## 2017-10-18 NOTE — DISCHARGE NOTE ADULT - CARE PROVIDER_API CALL
Jose Solis), Surgery  [address] [place] [address]  Phone: [phone]  Fax: [phone]

## 2017-10-18 NOTE — DISCHARGE NOTE ADULT - PLAN OF CARE
Recovery Please follow up with Dr. Solis next week. Call his office to schedule an appointment: [phone].   Contact your doctor or go to the ER for fever > 101.5, chills, nausea, vomiting, chest pain, shortness of breath, pain not controlled by medication or excessive bleeding.   -Continue eating your regular diet as tolerated and drinking plenty of fluids.   -For pain, you may take over-the-counter Tylenol as labeled. Tylenol. Do NOT exceed 4000mg acetaminophen/Tylenol total within 24 hours. You may take Advil and/or Motrin - but use these second due to the risk of bleeding. Please take Colace 1 tab twice daily while taking narcotic pain medication to avoid constipation.  -No heavy lifting >20 pounds or strenuous exercise.  -You may remove your bandages 48 hours after surgery. Please keep wounds clean and dry.   -You may shower but NO baths and NO swimming. Keep your incisions clean & dry. Avoid a direct stream of water over incision sites. Do not scrub. Pat dry when done.  -Do not remove any Steri-strips; they will fall off on their own after a few showers.

## 2017-10-18 NOTE — DISCHARGE NOTE ADULT - MEDICATION SUMMARY - MEDICATIONS TO TAKE
I will START or STAY ON the medications listed below when I get home from the hospital:    Cymbalta 30 mg oral delayed release capsule  -- orally once a day  -- Indication: For Home Meds    Vytorin 10 mg-40 mg oral tablet  -- 1 tab(s) by mouth once a day (at bedtime)  -- Indication: For Home Meds    cyclobenzaprine 10 mg oral tablet  -- orally once a day (at bedtime)  -- Indication: For Home Meds

## 2017-10-18 NOTE — PROGRESS NOTE ADULT - ASSESSMENT
53 y/o female s/p laparoscopic sigmoidectomy with end to end anastamosis 10/12/2017      Neuro: percocet PRN, ativan at bedtime, flexiril prn, cymbalta , zofran prn   CV: Normotensive goals, LR  Pulm: RA  GI/FEN: soft diet  : Voids  ID: none  Endo: None  Heme: None  PPx: SCDs, OOB/A, IS 51 y/o female s/p laparoscopic sigmoidectomy with end to end anastamosis 10/12/2017    - Pain/nausea control  - Continue diet   - OOBA   - awaiting full bowel function

## 2017-10-20 DIAGNOSIS — M79.7 FIBROMYALGIA: ICD-10-CM

## 2017-10-20 DIAGNOSIS — M51.36 OTHER INTERVERTEBRAL DISC DEGENERATION, LUMBAR REGION: ICD-10-CM

## 2017-10-20 DIAGNOSIS — R10.32 LEFT LOWER QUADRANT PAIN: ICD-10-CM

## 2017-10-20 DIAGNOSIS — E78.5 HYPERLIPIDEMIA, UNSPECIFIED: ICD-10-CM

## 2017-10-20 DIAGNOSIS — R39.89 OTHER SYMPTOMS AND SIGNS INVOLVING THE GENITOURINARY SYSTEM: ICD-10-CM

## 2017-10-20 DIAGNOSIS — K57.31 DIVERTICULOSIS OF LARGE INTESTINE WITHOUT PERFORATION OR ABSCESS WITH BLEEDING: ICD-10-CM

## 2017-10-20 DIAGNOSIS — F41.9 ANXIETY DISORDER, UNSPECIFIED: ICD-10-CM

## 2017-10-20 DIAGNOSIS — D18.09 HEMANGIOMA OF OTHER SITES: ICD-10-CM

## 2017-10-20 DIAGNOSIS — K58.9 IRRITABLE BOWEL SYNDROME WITHOUT DIARRHEA: ICD-10-CM

## 2017-10-20 DIAGNOSIS — Z90.710 ACQUIRED ABSENCE OF BOTH CERVIX AND UTERUS: ICD-10-CM

## 2017-10-20 DIAGNOSIS — N13.5 CROSSING VESSEL AND STRICTURE OF URETER WITHOUT HYDRONEPHROSIS: ICD-10-CM

## 2017-10-20 DIAGNOSIS — N32.1 VESICOINTESTINAL FISTULA: ICD-10-CM

## 2017-10-20 DIAGNOSIS — K56.699 OTHER INTESTINAL OBSTRUCTION UNSPECIFIED AS TO PARTIAL VERSUS COMPLETE OBSTRUCTION: ICD-10-CM

## 2017-10-20 LAB — SURGICAL PATHOLOGY STUDY: SIGNIFICANT CHANGE UP

## 2018-11-26 NOTE — DISCHARGE NOTE ADULT - NS MD DC PLAN IMMU FLU REF OTH
Patient is a 71y old  Female who presented with a chief complaint of small bowel obstruction with acute kidney injury (26 Nov 2018 08:58)      INTERVAL HPI/OVERNIGHT EVENTS:  tolerating Po diet (solids)  +BMs and flatus  no abdominal pain, nausea or vomiting  back pain - well controlled    MEDICATIONS  (STANDING):  ALBUTerol/ipratropium for Nebulization 3 milliLiter(s) Nebulizer every 6 hours  buDESOnide   0.5 milliGRAM(s) Respule 0.5 milliGRAM(s) Inhalation every 12 hours  chlorhexidine 4% Liquid 1 Application(s) Topical <User Schedule>  enoxaparin Injectable 30 milliGRAM(s) SubCutaneous every 24 hours  influenza   Vaccine 0.5 milliLiter(s) IntraMuscular once  lidocaine   Patch 1 Patch Transdermal daily  multivitamin/minerals 1 Tablet(s) Oral daily  naloxegol 25 milliGRAM(s) Oral daily  nicotine - 21 mG/24Hr(s) Patch 1 patch Transdermal daily  sucralfate 1 Gram(s) Oral four times a day    MEDICATIONS  (PRN):  diazepam    Tablet 5 milliGRAM(s) Oral at bedtime PRN insomnia  oxyCODONE    IR 10 milliGRAM(s) Oral every 4 hours PRN Severe Pain (7 - 10)  simethicone 80 milliGRAM(s) Chew every 8 hours PRN Indigestion      Allergies  Biaxin (Rash)  Cipro (Headache)  Flagyl (Headache)  penicillin (Rash; Swelling)  sulfa drugs (Unknown)      Review of Systems:  General: no fever or chills  CV: no CP or palpitations  Resp: no cough, hemoptysis +COPD/Emphysema +active smoker  Neuro: no focal weakness, no HA or syncope  Muscle: +chronic back pain    Vital Signs Last 24 Hrs  T(C): 36.9 (26 Nov 2018 05:54), Max: 37.3 (25 Nov 2018 21:19)  T(F): 98.4 (26 Nov 2018 05:54), Max: 99.2 (25 Nov 2018 21:19)  HR: 94 (26 Nov 2018 05:54) (84 - 98)  BP: 140/78 (26 Nov 2018 05:54) (138/82 - 161/85)  BP(mean): --  RR: 18 (26 Nov 2018 05:54) (18 - 20)  SpO2: 93% (26 Nov 2018 05:54) (93% - 95%)    PHYSICAL EXAM:  Constitutional: chronically ill appearing frail female alert and responsive eating breakfast  Neck: No LAD, no JVD  Respiratory: distant BS b/l, no wheeze  Cardiovascular: S1 and S2, RRR  Gastrointestinal: softly distended (baseline)  NT +BS - normoactive x4  Extremities: +clubbing  no edema +muscle wasting  Vascular: palpable pulses equal b/l  Neurological: alert, responsive no focal asymmetry  Skin: No rashes    LABS:                        11.6   9.1   )-----------( 148      ( 26 Nov 2018 07:16 )             35.3     11-26    139  |  102  |  27<H>  ----------------------------<  96  4.3   |  27  |  1.61<H>    Ca    8.1<L>      26 Nov 2018 07:16  Phos  2.9     11-26  Mg     1.4     11-26          RADIOLOGY & ADDITIONAL TESTS: Not indicated for this patient

## 2022-03-21 NOTE — DIETITIAN INITIAL EVALUATION ADULT. - 25 CAL
[FreeTextEntry1] : 78 year female who comes for followup. She is walking daily and has been eating more sweets. She denies having chest pain, SOB, HERRERA, dizziness, palpitations, orthopnea, PND or syncope 
[id number]

## 2022-09-21 NOTE — PATIENT PROFILE ADULT. - HEALTH/HEALTHCARE ANXIETIES, PROFILE
[Right] : right knee [All Views] : anteroposterior, lateral, skyline, and anteroposterior standing [There are no fractures, subluxations or dislocations. No significant abnormalities are seen] : There are no fractures, subluxations or dislocations. No significant abnormalities are seen [de-identified] : Neurologic: normal coordination, normal DTR UE/LE , normal sensation, normal mood and affect, orientated and able to communicate.\par \par Skin: normal skin, no rash, no ulcers and no lesions.\par \par Lymphatic: no obvious lymphadenopathy in areas examined.\par \par Constitutional: well developed and well nourished.\par \par Cardiovascular: peripheral vascular exam is grossly normal.\par \par Pulmonary: no respiratory distress, lungs clear to auscultation bilaterally.\par \par Abdomen: normal bowel sounds, non-tender, no HSM and no mass.\par  [FreeTextEntry3] : 1+ valgus  \par \par Lachman’s \par \par Anterior Draw  na

## 2023-09-07 PROBLEM — K57.92 DIVERTICULITIS OF INTESTINE, PART UNSPECIFIED, WITHOUT PERFORATION OR ABSCESS WITHOUT BLEEDING: Chronic | Status: ACTIVE | Noted: 2017-10-11

## 2023-09-07 PROBLEM — F41.9 ANXIETY DISORDER, UNSPECIFIED: Chronic | Status: ACTIVE | Noted: 2017-10-11

## 2023-09-07 PROBLEM — M79.7 FIBROMYALGIA: Chronic | Status: ACTIVE | Noted: 2017-10-11

## 2023-09-07 PROBLEM — K56.699 OTHER INTESTINAL OBSTRUCTION UNSPECIFIED AS TO PARTIAL VERSUS COMPLETE OBSTRUCTION: Chronic | Status: ACTIVE | Noted: 2017-10-11

## 2023-09-07 PROBLEM — K92.2 GASTROINTESTINAL HEMORRHAGE, UNSPECIFIED: Chronic | Status: ACTIVE | Noted: 2017-10-11

## 2023-09-18 ENCOUNTER — APPOINTMENT (OUTPATIENT)
Dept: OBGYN | Facility: CLINIC | Age: 58
End: 2023-09-18
Payer: COMMERCIAL

## 2023-09-18 PROCEDURE — 99396 PREV VISIT EST AGE 40-64: CPT
